# Patient Record
Sex: FEMALE | Race: BLACK OR AFRICAN AMERICAN | NOT HISPANIC OR LATINO | URBAN - METROPOLITAN AREA
[De-identification: names, ages, dates, MRNs, and addresses within clinical notes are randomized per-mention and may not be internally consistent; named-entity substitution may affect disease eponyms.]

---

## 2019-11-26 ENCOUNTER — APPOINTMENT (RX ONLY)
Dept: URBAN - METROPOLITAN AREA CLINIC 1 | Facility: CLINIC | Age: 39
Setting detail: DERMATOLOGY
End: 2019-11-26

## 2019-11-26 DIAGNOSIS — L29.89 OTHER PRURITUS: ICD-10-CM

## 2019-11-26 DIAGNOSIS — L29.8 OTHER PRURITUS: ICD-10-CM

## 2019-11-26 DIAGNOSIS — L81.4 OTHER MELANIN HYPERPIGMENTATION: ICD-10-CM

## 2019-11-26 DIAGNOSIS — L91.0 HYPERTROPHIC SCAR: ICD-10-CM | Status: STABLE

## 2019-11-26 PROCEDURE — ? INTRALESIONAL KENALOG

## 2019-11-26 PROCEDURE — ? TREATMENT REGIMEN

## 2019-11-26 PROCEDURE — 99213 OFFICE O/P EST LOW 20 MIN: CPT | Mod: 25

## 2019-11-26 PROCEDURE — ? COUNSELING

## 2019-11-26 PROCEDURE — 11900 INJECT SKIN LESIONS </W 7: CPT

## 2019-11-26 ASSESSMENT — LOCATION SIMPLE DESCRIPTION DERM: LOCATION SIMPLE: LEFT SHOULDER

## 2019-11-26 ASSESSMENT — LOCATION ZONE DERM: LOCATION ZONE: ARM

## 2019-11-26 ASSESSMENT — LOCATION DETAILED DESCRIPTION DERM: LOCATION DETAILED: LEFT ANTERIOR SHOULDER

## 2019-11-26 NOTE — PROCEDURE: INTRALESIONAL KENALOG
Kenalog Preparation: Kenalog
X Size Of Lesion In Cm (Optional): 0
Total Volume Injected (Ccs- Only Use Numbers And Decimals): 1
Include Z78.9 (Other Specified Conditions Influencing Health Status) As An Associated Diagnosis?: No
Concentration Of Solution Injected (Mg/Ml): 2.5
Consent: The risks of atrophy were reviewed with the patient.
Detail Level: Detailed
Medical Necessity Clause: This procedure was medically necessary because the lesions that were treated were:

## 2019-11-26 NOTE — PROCEDURE: TREATMENT REGIMEN
Plan: 11/26/19:  patient’s last visit was 9/24/19 for ILK #2\\nPatient does not see/feel any change, however admits to inconsistent use of topical steroid\\nPlan:\\nILK 5/5 injection #3 done today \\nContinue to apply betamethasone to keloid qd
Detail Level: Zone
Plan: 11/26/19\\nContinue betamethasone ointment QHS
Plan: 11/26/19\\nPlan:\\nApply HQ to keloids qhs once they flatten out.\\n4. MIPS\\nPlan: MIPS Quality.\\nQuality 226 (Tobacco Use Screening and Cessation Intervention): Patient screened f